# Patient Record
Sex: MALE | Race: WHITE
[De-identification: names, ages, dates, MRNs, and addresses within clinical notes are randomized per-mention and may not be internally consistent; named-entity substitution may affect disease eponyms.]

---

## 2018-02-22 ENCOUNTER — HOSPITAL ENCOUNTER (EMERGENCY)
Dept: HOSPITAL 26 - MED | Age: 38
Discharge: HOME | End: 2018-02-22
Payer: SELF-PAY

## 2018-02-22 VITALS — DIASTOLIC BLOOD PRESSURE: 110 MMHG | SYSTOLIC BLOOD PRESSURE: 147 MMHG

## 2018-02-22 VITALS — WEIGHT: 250 LBS | BODY MASS INDEX: 35 KG/M2 | HEIGHT: 71 IN

## 2018-02-22 VITALS — DIASTOLIC BLOOD PRESSURE: 94 MMHG | SYSTOLIC BLOOD PRESSURE: 150 MMHG

## 2018-02-22 DIAGNOSIS — M79.1: ICD-10-CM

## 2018-02-22 DIAGNOSIS — R55: Primary | ICD-10-CM

## 2018-02-22 DIAGNOSIS — F41.9: ICD-10-CM

## 2018-02-22 PROCEDURE — 12001 RPR S/N/AX/GEN/TRNK 2.5CM/<: CPT

## 2018-02-22 PROCEDURE — 99283 EMERGENCY DEPT VISIT LOW MDM: CPT

## 2018-02-22 NOTE — NUR
Patient discharged with BP A50/94; C/O HA 3/10; MD MADE AWARE. Written and 
verbal after care instructions given and explained. Patient alert, oriented and 
verbalized understanding of instructions. Ambulatory with steady gait. All 
questions addressed prior to discharge. ID band removed. Patient advised to 
follow up with PMD. Rx of ATARAX, PRELOSEC& MOTRIN given. Patient educated on 
indication of medication including possible reaction and side effects. 
Opportunity to ask questions provided and answered.

## 2018-02-22 NOTE — NUR
37/M BIBA C/O LIGHT HA& SOB THIS AM. PT STATED R BACK PAIN X 1WEEK. DENIES 
N/V/D; SKIN IS PINK/WARM/DRY; AAOX4 WITH EVEN AND STEADY GAIT; LUNGS CLEAR BL; 
PATIENT STATES PAIN OF 5/10 AT THIS TIME;PATIENT POSITIONED FOR COMFORT; HOB 
ELEVATED; BEDRAILS UP X2; BED DOWN. ER MD MADE AWARE OF PT STATUS.